# Patient Record
Sex: FEMALE | Race: WHITE
[De-identification: names, ages, dates, MRNs, and addresses within clinical notes are randomized per-mention and may not be internally consistent; named-entity substitution may affect disease eponyms.]

---

## 2017-01-12 ENCOUNTER — HOSPITAL ENCOUNTER (OUTPATIENT)
Dept: HOSPITAL 45 - C.LABBC | Age: 62
Discharge: HOME | End: 2017-01-12
Attending: INTERNAL MEDICINE
Payer: COMMERCIAL

## 2017-01-12 DIAGNOSIS — M19.90: ICD-10-CM

## 2017-01-12 DIAGNOSIS — E06.3: ICD-10-CM

## 2017-01-12 DIAGNOSIS — E03.9: ICD-10-CM

## 2017-01-12 DIAGNOSIS — Z00.00: Primary | ICD-10-CM

## 2017-01-12 DIAGNOSIS — E04.9: ICD-10-CM

## 2017-01-12 LAB
ALBUMIN/GLOB SERPL: 1.1 {RATIO} (ref 0.9–2)
ALP SERPL-CCNC: 63 U/L (ref 45–117)
ALT SERPL-CCNC: 26 U/L (ref 12–78)
ANION GAP SERPL CALC-SCNC: 8 MMOL/L (ref 3–11)
AST SERPL-CCNC: 15 U/L (ref 15–37)
BASOPHILS # BLD: 0.05 K/UL (ref 0–0.2)
BASOPHILS NFR BLD: 1.1 %
BUN SERPL-MCNC: 19 MG/DL (ref 7–18)
BUN/CREAT SERPL: 28.1 (ref 10–20)
CALCIUM SERPL-MCNC: 8.4 MG/DL (ref 8.5–10.1)
CHLORIDE SERPL-SCNC: 107 MMOL/L (ref 98–107)
CHOLEST/HDLC SERPL: 2.4 {RATIO}
CO2 SERPL-SCNC: 27 MMOL/L (ref 21–32)
COMPLETE: YES
CREAT SERPL-MCNC: 0.68 MG/DL (ref 0.6–1.2)
EOSINOPHIL NFR BLD AUTO: 328 K/UL (ref 130–400)
EST. AVERAGE GLUCOSE BLD GHB EST-MCNC: 111 MG/DL
GLOBULIN SER-MCNC: 3.3 GM/DL (ref 2.5–4)
GLUCOSE SERPL-MCNC: 81 MG/DL (ref 70–99)
GLUCOSE UR QL: 79 MG/DL
HCT VFR BLD CALC: 42.3 % (ref 37–47)
IG%: 0.2 %
IMM GRANULOCYTES NFR BLD AUTO: 25.2 %
KETONES UR QL STRIP: 105 MG/DL
LYMPHOCYTES # BLD: 1.15 K/UL (ref 1.2–3.4)
MCH RBC QN AUTO: 30.5 PG (ref 25–34)
MCHC RBC AUTO-ENTMCNC: 33.8 G/DL (ref 32–36)
MCV RBC AUTO: 90.2 FL (ref 80–100)
MONOCYTES NFR BLD: 9.2 %
NEUTROPHILS # BLD AUTO: 4.2 %
NEUTROPHILS NFR BLD AUTO: 60.1 %
NITRITE UR QL STRIP: 45 MG/DL (ref 0–150)
PH UR: 193 MG/DL (ref 0–200)
PMV BLD AUTO: 10.1 FL (ref 7.4–10.4)
POTASSIUM SERPL-SCNC: 4 MMOL/L (ref 3.5–5.1)
RBC # BLD AUTO: 4.69 M/UL (ref 4.2–5.4)
SODIUM SERPL-SCNC: 142 MMOL/L (ref 136–145)
TSH SERPL-ACNC: 1.16 UIU/ML (ref 0.3–4.5)
VERY LOW DENSITY LIPOPROT CALC: 9 MG/DL
WBC # BLD AUTO: 4.56 K/UL (ref 4.8–10.8)

## 2017-02-24 ENCOUNTER — HOSPITAL ENCOUNTER (OUTPATIENT)
Dept: HOSPITAL 45 - C.MAMM | Age: 62
Discharge: HOME | End: 2017-02-24
Attending: OBSTETRICS & GYNECOLOGY
Payer: COMMERCIAL

## 2017-02-24 DIAGNOSIS — Z12.31: Primary | ICD-10-CM

## 2017-02-24 NOTE — MAMMOGRAPHY REPORT
BILATERAL DIGITAL SCREENING MAMMOGRAM WITH CAD: 2/24/2017

CLINICAL HISTORY: Routine screening.  Patient has no complaints.  





TECHNIQUE:  Current study was also evaluated with a Computer Aided Detection (CAD) system.  Bilatera
l CC and MLO views were obtained.



COMPARISON: Comparison is made to exams dated:  2/19/2016 mammogram, 2/13/2015 mammogram, 2/11/2014 
mammogram, 2/4/2013 mammogram, 1/26/2011 mammogram, and 1/31/2012 mammogram - [facility].   



BREAST COMPOSITION:  There are scattered areas of fibroglandular density in both breasts.  



FINDINGS:  No suspicious masses, calcifications, or areas of architectural distortion are noted in e
ither breast. There has been no significant interval change compared to prior exams.  





IMPRESSION:  ACR BI-RADS CATEGORY 1: NEGATIVE

There is no mammographic evidence of malignancy. A 1 year screening mammogram is recommended.  The p
atient will receive written notification of the results.  





Approximately 10% of breast cancers are not detected with mammography. A negative mammographic repor
t should not delay biopsy if a clinically suggestive mass is present.



Debi Blandon M.D.          

/:2/24/2017 08:22:58  



Imaging Technologist: Margot VILLARREAL(VIDYA)(GIANCARLO)(BD), [facility]

letter sent: Normal 1/2  

BI-RADS Code: ACR BI-RADS Category 1: Negative

## 2017-03-04 ENCOUNTER — HOSPITAL ENCOUNTER (OUTPATIENT)
Dept: HOSPITAL 45 - C.RAD | Age: 62
Discharge: HOME | End: 2017-03-04
Attending: NURSE PRACTITIONER
Payer: COMMERCIAL

## 2017-03-04 DIAGNOSIS — M25.472: ICD-10-CM

## 2017-03-04 DIAGNOSIS — S82.65XA: Primary | ICD-10-CM
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
## 2017-03-04 NOTE — DIAGNOSTIC IMAGING REPORT
LEFT ANKLE 3 VIEWS



CLINICAL HISTORY: Left ankle twisting injury.



FINDINGS: 3 views of left ankle are obtained. No prior studies are available for

comparison at the time of dictation. The skeletal structures are osteopenic.

There is a small avulsion fracture from the inferior aspect of the lateral

malleolus. No additional fracture is seen. The ankle mortise is intact. There is

an ankle joint effusion. Soft tissue edema is present around the ankle, greatest

overlying the lateral malleolus. A plantar calcaneal enthesophyte is noted.



IMPRESSION: There is a small avulsion fracture from the inferior lateral

malleolus with associated joint effusion and soft tissue edema.







Electronically signed by:  Jg Paulino M.D.

3/4/2017 3:51 PM



Dictated Date/Time:  3/4/2017 3:49 PM

## 2018-03-02 ENCOUNTER — HOSPITAL ENCOUNTER (OUTPATIENT)
Dept: HOSPITAL 45 - C.MAMM | Age: 63
Discharge: HOME | End: 2018-03-02
Attending: OBSTETRICS & GYNECOLOGY
Payer: COMMERCIAL

## 2018-03-02 DIAGNOSIS — Z12.31: Primary | ICD-10-CM

## 2018-03-02 NOTE — MAMMOGRAPHY REPORT
BILATERAL DIGITAL SCREENING MAMMOGRAM TOMOSYNTHESIS WITH CAD: 3/2/2018

CLINICAL HISTORY: Routine screening.  Patient has no complaints.  





TECHNIQUE:  Breast tomosynthesis in addition to standard 2D mammography was performed. Current study 
was also evaluated with a Computer Aided Detection (CAD) system.  



COMPARISON: Comparison is made to exams dated:  2/24/2017 mammogram, 2/19/2016 mammogram, 2/13/2015 m
ammogram, 2/11/2014 mammogram, 2/4/2013 mammogram, and 1/31/2012 mammogram - [facility]
nter.   



BREAST COMPOSITION:  There are scattered areas of fibroglandular density in both breasts.  



FINDINGS:  No suspicious masses, calcifications, or areas of architectural distortion are noted in ei
ther breast. There has been no significant interval change compared to prior exams.  





IMPRESSION:  ACR BI-RADS CATEGORY 1: NEGATIVE

There is no mammographic evidence of malignancy. A 1 year screening mammogram is recommended.  The pa
tient will receive written notification of the results.  





Approximately 10% of breast cancers are not detected with mammography. A negative mammographic report
 should not delay biopsy if a clinically suggestive mass is present.



Debi Blandon M.D.          

ah/:3/2/2018 12:26:45  



Imaging Technologist: Margot VILLARREAL(VIDYA)(GIANCARLO)(BD), [facility]

letter sent: Normal 1/2  

BI-RADS Code: ACR BI-RADS Category 1: Negative